# Patient Record
Sex: MALE | Race: WHITE | NOT HISPANIC OR LATINO | Employment: UNEMPLOYED | URBAN - METROPOLITAN AREA
[De-identification: names, ages, dates, MRNs, and addresses within clinical notes are randomized per-mention and may not be internally consistent; named-entity substitution may affect disease eponyms.]

---
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
## 2024-05-01 ENCOUNTER — VIRTUAL VISIT (OUTPATIENT)
Dept: FAMILY MEDICINE | Facility: CLINIC | Age: 19
End: 2024-05-01
Payer: COMMERCIAL

## 2024-05-01 ENCOUNTER — LAB (OUTPATIENT)
Dept: LAB | Facility: CLINIC | Age: 19
End: 2024-05-01
Payer: COMMERCIAL

## 2024-05-01 DIAGNOSIS — R50.9 FEVER, UNSPECIFIED FEVER CAUSE: Primary | ICD-10-CM

## 2024-05-01 DIAGNOSIS — R59.9 SWOLLEN LYMPH NODES: ICD-10-CM

## 2024-05-01 DIAGNOSIS — R50.9 FEVER, UNSPECIFIED FEVER CAUSE: ICD-10-CM

## 2024-05-01 LAB
ALBUMIN SERPL BCG-MCNC: 4.5 G/DL (ref 3.5–5.2)
ALP SERPL-CCNC: 192 U/L (ref 65–260)
ALT SERPL W P-5'-P-CCNC: 221 U/L (ref 0–50)
AST SERPL W P-5'-P-CCNC: 167 U/L (ref 0–35)
BASOPHILS # BLD AUTO: ABNORMAL 10*3/UL
BASOPHILS NFR BLD AUTO: ABNORMAL %
BILIRUB DIRECT SERPL-MCNC: <0.2 MG/DL (ref 0–0.3)
BILIRUB SERPL-MCNC: 0.7 MG/DL
DEPRECATED S PYO AG THROAT QL EIA: NEGATIVE
EOSINOPHIL # BLD AUTO: ABNORMAL 10*3/UL
EOSINOPHIL NFR BLD AUTO: ABNORMAL %
ERYTHROCYTE [DISTWIDTH] IN BLOOD BY AUTOMATED COUNT: 12.3 % (ref 10–15)
FLUAV AG SPEC QL IA: NEGATIVE
FLUBV AG SPEC QL IA: NEGATIVE
GROUP A STREP BY PCR: NOT DETECTED
HCT VFR BLD AUTO: 39 % (ref 40–53)
HGB BLD-MCNC: 13.9 G/DL (ref 13.3–17.7)
IMM GRANULOCYTES # BLD: ABNORMAL 10*3/UL
IMM GRANULOCYTES NFR BLD: ABNORMAL %
LYMPHOCYTES # BLD AUTO: ABNORMAL 10*3/UL
LYMPHOCYTES NFR BLD AUTO: ABNORMAL %
MCH RBC QN AUTO: 30.1 PG (ref 26.5–33)
MCHC RBC AUTO-ENTMCNC: 35.6 G/DL (ref 31.5–36.5)
MCV RBC AUTO: 84 FL (ref 78–100)
MONOCYTES # BLD AUTO: ABNORMAL 10*3/UL
MONOCYTES NFR BLD AUTO: ABNORMAL %
NEUTROPHILS # BLD AUTO: ABNORMAL 10*3/UL
NEUTROPHILS NFR BLD AUTO: ABNORMAL %
NRBC # BLD AUTO: 0 10E3/UL
NRBC BLD AUTO-RTO: 0 /100
PLATELET # BLD AUTO: 147 10E3/UL (ref 150–450)
PROT SERPL-MCNC: 8.4 G/DL (ref 6.4–8.3)
RBC # BLD AUTO: 4.62 10E6/UL (ref 4.4–5.9)
WBC # BLD AUTO: 9.5 10E3/UL (ref 4–11)

## 2024-05-01 PROCEDURE — 85027 COMPLETE CBC AUTOMATED: CPT | Performed by: PATHOLOGY

## 2024-05-01 PROCEDURE — 80076 HEPATIC FUNCTION PANEL: CPT | Performed by: PATHOLOGY

## 2024-05-01 PROCEDURE — 87804 INFLUENZA ASSAY W/OPTIC: CPT

## 2024-05-01 PROCEDURE — 86308 HETEROPHILE ANTIBODY SCREEN: CPT

## 2024-05-01 PROCEDURE — 86735 MUMPS ANTIBODY: CPT | Mod: 90 | Performed by: PATHOLOGY

## 2024-05-01 PROCEDURE — 99213 OFFICE O/P EST LOW 20 MIN: CPT | Mod: 95

## 2024-05-01 PROCEDURE — 36415 COLL VENOUS BLD VENIPUNCTURE: CPT | Performed by: PATHOLOGY

## 2024-05-01 PROCEDURE — 99000 SPECIMEN HANDLING OFFICE-LAB: CPT | Performed by: PATHOLOGY

## 2024-05-01 PROCEDURE — 87651 STREP A DNA AMP PROBE: CPT

## 2024-05-01 PROCEDURE — 85007 BL SMEAR W/DIFF WBC COUNT: CPT

## 2024-05-01 PROCEDURE — 87799 DETECT AGENT NOS DNA QUANT: CPT

## 2024-05-01 ASSESSMENT — ENCOUNTER SYMPTOMS
FEVER: 1
COUGH: 1

## 2024-05-01 NOTE — PROGRESS NOTES
Sebastián is a 18 year old who is being evaluated via a billable video visit.    How would you like to obtain your AVS? MyChart  If the video visit is dropped, the invitation should be resent by: Text to cell phone: [phone]  Will anyone else be joining your video visit? No      Assessment & Plan     Fever, unspecified fever cause  Acute, ongoing for 5 days. Considering pt is having fevers without significant respiratory symptoms, has swollen gland on left upper anterior neck and lives in dormitory setting will test for mono and mumps as well. College student, track athlete at [employer] so frequent follow up could be difficult so will also get CBC and hepatic panel with initial labs.   - Influenza A & B Antigen - Clinic Collect  - Eden Barr Virus Quantitative PCR, Plasma  - CBC with platelets and differential  - Hepatic panel (Albumin, ALT, AST, Bili, Alk Phos, TP)  - Streptococcus A Rapid Screen w/Reflex to PCR - Clinic Collect  - Mumps Immune Status, IgM    Swollen lymph nodes  DDx includes lymphadenopathy vs parotitis.   - Streptococcus A Rapid Screen w/Reflex to PCR - Clinic Collect  - Mumps Immune Status, IgM                See Patient Instructions    Subjective   Sebastián is a 18 year old, presenting for the following health issues:  Fever and Cough    Fever  This is a new problem. The current episode started in the past 7 days. Associated symptoms include coughing and a fever.   Cough    History of Present Illness       Reason for visit:  Concerned for Mono  Symptom onset:  3-7 days ago  Symptoms include:  Fever and mild cough  Symptom intensity:  Mild    101.5 is highest, came down a little with Dayquil.   Started Saturday  No sore throat. No sinus congestion. + cough. Cough is mild/comes and goes, worse at night.     No known contacts + for mono but parents concerned due to his swollen lymph node.   Was on spring break 5 or 6 weeks ago and had fever then for a few day as well without significant URI symptoms.      No abdominal pain.   No flu shot this year.   Home covid test negative on Sunday.     No chest pain.   Slightly winded but not at rest.                 Review of Systems  Constitutional, HEENT, cardiovascular, pulmonary, gi and gu systems are negative, except as otherwise noted.      Objective           Vitals:  No vitals were obtained today due to virtual visit.    Physical Exam   GENERAL: alert and no distress  EYES: Eyes grossly normal to inspection.  No discharge or erythema, or obvious scleral/conjunctival abnormalities.  RESP: No audible wheeze, cough, or visible cyanosis.    SKIN: Visible skin clear. No significant rash, abnormal pigmentation or lesions.  NEURO: Cranial nerves grossly intact.  Mentation and speech appropriate for age.  PSYCH: Appropriate affect, tone, and pace of words    No results found for this or any previous visit (from the past 24 hour(s)).      Video-Visit Details    Type of service:  Video Visit   Originating Location (pt. Location): Home    Distant Location (provider location):  On-site  Platform used for Video Visit: Nick  Signed Electronically by: JENISE Lewis CNP

## 2024-05-01 NOTE — PATIENT INSTRUCTIONS
Schedule lab appointment - checking for influenza, strep, mono (EBV) and mumps testing, blood counts, and liver function test.     If all negative - you likely have an upper respiratory infection. The majority of these are caused by various viruses that circulate every year. These typically start improving around day 7-10. If worsening or not improving by day 10-14 you should schedule a follow up appointment as this can indicate a bacterial infection.     If positive I will send instructions on when to recheck/follow up.     Monitor your lymph node.  Monitor your Heart Rate on your apple watch. Follow up if it's consistently elevated (above 100 while you are at rest and you have a fever).       OVER THE COUNTER MEDs and COMFORT MEASURES  NON-Medications  Push hydration (you should be annoyed about peeing frequently)  Hot water + honey, sip on throughout the day  Cool mist humidifier, at bedside   Steam shower for 10-15 minutes 2-3 times per day for comfort to relieve cough    If you take any herbal supplements make sure they are from a reputable company with 3rd party testing.     Acetaminophen  (aka Tylenol) 1000 mg up to 3 x per day   [Fever, muscle aches, head/sinus pain, chest tightness/soreness]  (Do not take if you have liver disease, avoid alcohol while taking).  I recommend alternating acetaminophen / ibuprofen every 4 hours    Ibuprofen (aka motrin, Advil) 200-800 mg up to 3 x per day  [Fever, inflammation, congestion, pain/soreness].  (Do not take if you have kidney disease).   If you have heartburn/reflux, take blood thinners, or take a selective serotonin reuptake inhibitor, you should take over the counter famotidine (pepcid) or pantoprazole 20 mg daily (if already taking can increase dose) while you are taking ibuprofen regularly to prevent worsening.    Fluticasone (flonase) nasal spray one time daily (especially for ear pain)  OK to continue other over the counter allergy meds

## 2024-05-02 ENCOUNTER — TELEPHONE (OUTPATIENT)
Dept: PEDIATRICS | Facility: CLINIC | Age: 19
End: 2024-05-02
Payer: COMMERCIAL

## 2024-05-02 ENCOUNTER — TELEPHONE (OUTPATIENT)
Dept: FAMILY MEDICINE | Facility: CLINIC | Age: 19
End: 2024-05-02
Payer: COMMERCIAL

## 2024-05-02 DIAGNOSIS — D69.6 THROMBOCYTOPENIA (H): Primary | ICD-10-CM

## 2024-05-02 LAB
BASOPHILS # BLD MANUAL: 0.1 10E3/UL (ref 0–0.2)
BASOPHILS NFR BLD MANUAL: 1 %
EBV DNA SERPL NAA+PROBE-ACNC: 1850 IU/ML
EBV DNA SERPL NAA+PROBE-LOG#: 3.3 {LOG_COPIES}/ML
EOSINOPHIL # BLD MANUAL: 0.1 10E3/UL (ref 0–0.7)
EOSINOPHIL NFR BLD MANUAL: 1 %
LYMPHOCYTES # BLD MANUAL: 6.8 10E3/UL (ref 0.8–5.3)
LYMPHOCYTES NFR BLD MANUAL: 72 %
MONOCYTES # BLD MANUAL: 0.8 10E3/UL (ref 0–1.3)
MONOCYTES NFR BLD AUTO: POSITIVE %
MONOCYTES NFR BLD MANUAL: 8 %
NEUTROPHILS # BLD MANUAL: 1.7 10E3/UL (ref 1.6–8.3)
NEUTROPHILS NFR BLD MANUAL: 18 %
PATH REV: ABNORMAL
PLAT MORPH BLD: ABNORMAL
RBC MORPH BLD: ABNORMAL

## 2024-05-02 NOTE — TELEPHONE ENCOUNTER
I recommend not returning to school until fevers have improved. I sent him a school excuse note via [employer]. Let me know if he needs adjustments to the note. Its reasonable to stay home for first 1-2 weeks, intermittently through week 3-4.     Also let him know update - the rapid mono test is positive which is good to know since that is reassuring the it is the cause of his abnormal blood counts and liver labs.

## 2024-05-02 NOTE — TELEPHONE ENCOUNTER
"Please call patient. And review my note below. Labs indicate mono, still processing some additional labs for confirmation.   I did try calling him x 3 this afternoon but it goes directly to .     He needs to follow up in 3-4 weeks. Some of his abnormal labs need to be monitored until they return to normal. Ideally in person,     1. MILD low level of platelets (called thrombocytopenia) but reassuring your platelets look normal size/shape. Monitor for signs of easy bleeding like bleeding gums, bruising or small red dots (petechiae), blood in stool or urine. This is likely caused by irritation of liver and spleen due to mono.     2. Elevated liver enzymes (ALT, AST, 4-5 times normal) abstain from alcohol. Minimize tylenol use.    Do not participate in strenuous activity for at least 21 days since symptom onset. For you, if symptoms started Saturday 4/27 you should not return to strenuous track practice before Saturday 5/18. Light activity is fine.           Infectious mononucleosis, In general, mononucleosis is not serious illness. However, it can lead to significant loss of time from school or work due to profound fatigue and, on rare occasions, can cause severe or even life-threatening illness    HOW DID I GET MONO?  Caused most often by the Eden-Barr virus (EBV). EBV can spread through contact with saliva from an infected person; for example, you can get it through kissing, sharing eating utensils with, or drinking from the same glass as someone who is infected with the virus.  Many people are exposed to EBV at some point during childhood, although they may not realize it at the time. For adolescents and young adults who were not infected as a child, exposure often results from contact with an infected person's saliva. After a person has been exposed, the virus remains in their body for the rest of their life. This condition is called \"viral latency,\" meaning it is dormant or inactive and not causing symptoms. " "People spread the infection to others without realizing it through intermittent shedding of the virus through their saliva. In addition, it may also be possible to spread the virus through other bodily fluids, such as semen or vaginal secretions.    Common symptoms -- It may take four to eight weeks after initially acquiring the virus for the first symptoms (such as body aches, headache, and low-grade fever) to appear. The most common mono symptoms include:  ?Fever (temperature greater than 100.4 F or 38 C)  ?Sore throat  ?Enlarged lymph nodes in the neck (and sometimes elsewhere in the body)  ?Fatigue, which may be severe and can occasionally last for more than a month    Enlargement of the spleen -- The spleen is an organ in the left upper abdomen, just under the diaphragm     It becomes enlarged in about half of people with mono. Due to the spleen enlarging, need to avoid contact sports or heavy lifting to prevent rare complication of splenic rupture causing hemorrhage that can occur after trauma but can also happen spontaneously. Symptoms of rupture include sudden, sharp pain in the abdomen, especially on the left side. Sometimes the left shoulder hurts; this is due to \"referred pain\" from blood irritating the diaphragm. Splenic rupture is a potentially life-threatening complication that requires immediate medical treatment. Therefore, any severe abdominal pain in someone with mono is an emergency that requires urgent evaluation     MONO TREATMENT  The goal of treatment is to ease the symptoms while the immune system contains the virus. Antibiotics (which are used to treat bacterial infections) are not helpful because a virus causes mono. No antiviral medications are known to treat or cure Eden-Barr virus effectively.    Pain and fever -- Sore throat, muscle aches, and fever can be treated with nonprescription medications, such as acetaminophen (aka Tylenol) or ibuprofen (aka Motrin, Advil). The liver breaks " down acetaminophen. For this reason, it is important to closely follow the dosing instructions or your healthcare provider's instructions to take this medication safely. Acetaminophen and ibuprofen are also recommended for use in children. Aspirin should not be given to young children with mono because of possible liver complications.    Rest -- Mono can cause severe fatigue, although most people recover within two to four weeks. For some, significant tiredness lasts for weeks to months. Early in the infection, it is important to get adequate rest, although complete bed rest is unnecessary.    Diet -- Feeling ill often causes a loss of appetite. This is normal and usually improves as the infection resolves. It is essential, even if you have no appetite, to drink an adequate amount of fluids. This is especially true if you take ibuprofen for pain or fever because ibuprofen can affect kidney function if you become dehydrated. You are drinking adequate fluids if your urine is a pale yellow color.

## 2024-05-02 NOTE — TELEPHONE ENCOUNTER
----- Message from JENISE Morales CNP sent at 5/2/2024 12:57 PM CDT -----  Pls call pt. See my cmment and note for details. Also sent this in lark message but want to confirm pt received message.

## 2024-05-02 NOTE — TELEPHONE ENCOUNTER
Attempt #1 to call patient.     RN left voicemail and requested return call to [facility] at [phone].     Fe Coulter RN, BSN  [facility]: [place]

## 2024-05-02 NOTE — TELEPHONE ENCOUNTER
Attempt #1 to call patient.     RN left voicemail and requested return call to [facility] at [phone].     Fe Coulter RN, BSN  [facility]: [place]

## 2024-05-02 NOTE — TELEPHONE ENCOUNTER
Routing to Álvaro rBuce.    Patient asking if it is ok for him to return to school.    Patient still having temps in the evening.        RN called and spoke with patient.    RN reviewed providers message and recommendations.    Patient verbalized understanding.    RN advised if patient needed letter for track to reach out.    Patient asking about returning to school.    RN advised she would reach out to provider.    Juanito Estevez RN, BSN, PHN  [facility]

## 2024-05-02 NOTE — TELEPHONE ENCOUNTER
RN left  for patient requesting call back to clinic.      RN called to relay providers message.    Juanito Estevez RN, BSN, PHN  [facility]

## 2024-05-02 NOTE — LETTER
May 2, 2024      Duane Burger  [address] [facility] [address]        To Whom It May Concern:    Duane Burger was seen on 5/1/24.  Please excuse him from classes until 5/6/24 due to illness. He may require intermittent time away through 5/18/24. Please allow him assignment extensions according to your institutions serious illness policies through this time frame.         Sincerely,        Álvaro Bruce, AB, APRN, FNP-C  Family Nurse Practitioner  [facility]

## 2024-05-02 NOTE — TELEPHONE ENCOUNTER
Patient calling back, I advised him of letter for school excuse in Mychart.  He found the letter, says it looks good.    He will call or mychart with further concerns.    Carmen ALLEN RN  [facility] Triage

## 2024-05-04 LAB — MUV IGM SER IA-ACNC: 1.37 IV

## 2024-06-06 ENCOUNTER — OFFICE VISIT (OUTPATIENT)
Dept: FAMILY MEDICINE | Facility: CLINIC | Age: 19
End: 2024-06-06
Payer: COMMERCIAL

## 2024-06-06 VITALS
WEIGHT: 179.4 LBS | DIASTOLIC BLOOD PRESSURE: 76 MMHG | SYSTOLIC BLOOD PRESSURE: 147 MMHG | BODY MASS INDEX: 21.18 KG/M2 | OXYGEN SATURATION: 96 % | TEMPERATURE: 98.1 F | RESPIRATION RATE: 20 BRPM | HEART RATE: 78 BPM | HEIGHT: 77 IN

## 2024-06-06 DIAGNOSIS — B27.99 INFECTIOUS MONONUCLEOSIS, WITH OTHER COMPLICATION, INFECTIOUS MONONUCLEOSIS DUE TO UNSPECIFIED ORGANISM: Primary | ICD-10-CM

## 2024-06-06 PROCEDURE — 99213 OFFICE O/P EST LOW 20 MIN: CPT

## 2024-06-06 NOTE — PROGRESS NOTES
Assessment & Plan     Infectious mononucleosis, with other complication, infectious mononucleosis due to unspecified organism  Acute, resolved. Feeling back to baseline health. Will recheck labs to ensure liver labs and cbc improved.   - CBC with platelets and differential  - Comprehensive metabolic panel (BMP + Alb, Alk Phos, ALT, AST, Total. Bili, TP)              See Patient Instructions    Subjective   Will is a 18 year old, presenting for the following health issues:  RECHECK        6/6/2024     1:03 PM   Additional Questions   Roomed by Emili PONCE MA         6/6/2024     1:03 PM   Patient Reported Additional Medications   Patient reports taking the following new medications Conrad allergy medication     History of Present Illness       Reason for visit:  Check up visit related to prior mono diagnosis    He eats 4 or more servings of fruits and vegetables daily.He consumes 1 sweetened beverage(s) daily.He exercises with enough effort to increase his heart rate 30 to 60 minutes per day.  He exercises with enough effort to increase his heart rate 7 days per week.   He is taking medications regularly.       Was improving and feeing better then got a Fever Sunday night and went away Tuesday night, cough and mucous build up.     Patient has been taking OTC allergy Conrad brand.     No abdominal pain.   No pain/swelling in neck.   No chest pain, SOB  No headaches.   No urinary problems.    Track season over, lifts weights, basketball, .    Energy level is back to normal.  No noticeable brain fog.   No issues finishing semester of school.      Wt Readings from Last 5 Encounters:   06/06/24 81.4 kg (179 lb 6.4 oz) (81%, Z= 0.87)*   07/26/23 78.9 kg (174 lb) (79%, Z= 0.81)*   07/12/23 78.5 kg (173 lb) (78%, Z= 0.79)*   04/14/21 73.9 kg (163 lb) (82%, Z= 0.92)*   02/04/20 68.9 kg (152 lb) (83%, Z= 0.96)*     * Growth percentiles are based on CDC (Boys, 2-20 Years) data.            Review of Systems  Constitutional,  "HEENT, cardiovascular, pulmonary, gi and gu systems are negative, except as otherwise noted.      Objective    BP (!) 147/76 (BP Location: Left arm, Patient Position: Chair, Cuff Size: Adult Regular)   Pulse 78   Temp 98.1  F (36.7  C) (Oral)   Resp 20   Ht 1.943 m (6' 4.5\")   Wt 81.4 kg (179 lb 6.4 oz)   SpO2 96%   BMI 21.55 kg/m    Body mass index is 21.55 kg/m .  Physical Exam   GENERAL: alert and no distress  NECK: no adenopathy, no asymmetry, masses, or scars  RESP: lungs clear to auscultation - no rales, rhonchi or wheezes  CV: regular rate and rhythm, normal S1 S2, no S3 or S4, no murmur, click or rub, no peripheral edema  ABDOMEN: soft, nontender, no hepatosplenomegaly, no masses and bowel sounds normal  MS: no gross musculoskeletal defects noted, no edema    Lab on 05/01/2024   Component Date Value Ref Range Status    EBV DNA Log IU/mL 05/01/2024 3.3   Final    EBV DNA IU/mL, Instrument 05/01/2024 1,850 (H)  Not Detected IU/mL Final    Protein Total 05/01/2024 8.4 (H)  6.4 - 8.3 g/dL Final    Albumin 05/01/2024 4.5  3.5 - 5.2 g/dL Final    Bilirubin Total 05/01/2024 0.7  <=1.2 mg/dL Final    Alkaline Phosphatase 05/01/2024 192  65 - 260 U/L Final    Reference intervals for this test were updated on 11/14/2023 to more accurately reflect our healthy population. There may be differences in the flagging of prior results with similar values performed with this method. Interpretation of those prior results can be made in the context of the updated reference intervals.    AST 05/01/2024 167 (H)  0 - 35 U/L Final    Reference intervals for this test were updated on 6/12/2023 to more accurately reflect our healthy population. There may be differences in the flagging of prior results with similar values performed with this method. Interpretation of those prior results can be made in the context of the updated reference intervals.    ALT 05/01/2024 221 (H)  0 - 50 U/L Final    Reference intervals for this test " were updated on 6/12/2023 to more accurately reflect our healthy population. There may be differences in the flagging of prior results with similar values performed with this method. Interpretation of those prior results can be made in the context of the updated reference intervals.      Bilirubin Direct 05/01/2024 <0.20  0.00 - 0.30 mg/dL Final    Mumps Erica IgM 05/01/2024 1.37 (H)  <=0.79 IV Final    INTERPRETIVE INFORMATION: Mumps Virus Antibody, IgM      0.79 IV or less:    Negative - No significant level of                        detectable IgM antibody to mumps                        virus.    0.80 - 1.20 IV:     Equivocal - Borderline levels of IgM                        antibody to mumps virus. Repeat                        testing in 10-14 days may be helpful.    1.21 IV or greater: Positive - Presence of IgM antibody                        to mumps virus detected, which may                        indicate a current or recent                        infection. However, low levels of IgM                        antibody may occasionally persist for                        more than 12 months post-infection or                        immunization.  Performed By: [facility] [employer]  [address] [place] [id number]  : Manav Armenta MD, PhD  CLIA Number: [id number]    WBC Count 05/01/2024 9.5  4.0 - 11.0 10e3/uL Final    RBC Count 05/01/2024 4.62  4.40 - 5.90 10e6/uL Final    Hemoglobin 05/01/2024 13.9  13.3 - 17.7 g/dL Final    Hematocrit 05/01/2024 39.0 (L)  40.0 - 53.0 % Final    MCV 05/01/2024 84  78 - 100 fL Final    MCH 05/01/2024 30.1  26.5 - 33.0 pg Final    MCHC 05/01/2024 35.6  31.5 - 36.5 g/dL Final    RDW 05/01/2024 12.3  10.0 - 15.0 % Final    Platelet Count 05/01/2024 147 (L)  150 - 450 10e3/uL Final    NRBCs per 100 WBC 05/01/2024 0  <1 /100 Final    Absolute NRBCs 05/01/2024 0.0  10e3/uL Final    % Neutrophils 05/01/2024 18  % Final    % Lymphocytes 05/01/2024 72  %  Final    % Monocytes 05/01/2024 8  % Final    % Eosinophils 05/01/2024 1  % Final    % Basophils 05/01/2024 1  % Final    Absolute Neutrophils 05/01/2024 1.7  1.6 - 8.3 10e3/uL Final    Absolute Lymphocytes 05/01/2024 6.8 (H)  0.8 - 5.3 10e3/uL Final    Absolute Monocytes 05/01/2024 0.8  0.0 - 1.3 10e3/uL Final    Absolute Eosinophils 05/01/2024 0.1  0.0 - 0.7 10e3/uL Final    Absolute Basophils 05/01/2024 0.1  0.0 - 0.2 10e3/uL Final    RBC Morphology 05/01/2024 Confirmed RBC Indices   Final    Platelet Assessment 05/01/2024 Automated Count Confirmed. Platelet morphology is normal.  Automated Count Confirmed. Platelet morphology is normal. Final    Pathologist Review Comments (Blood) 05/01/2024 Atypical lymphocytosis present, favored to be reactive in setting of the patient's known mononucleosis (positive EBV testing and monospot). Clinical correlation is recommended.    Bianka Epperson MD on 5/2/2024 at 5:53 PM   Final    This is an appended report. These results have been appended to a previously preliminary verified report.    Mononucleosis Screen 05/01/2024 Positive (A)  Negative Final           Signed Electronically by: JENISE Lewis CNP

## 2024-06-07 ENCOUNTER — LAB (OUTPATIENT)
Dept: LAB | Facility: CLINIC | Age: 19
End: 2024-06-07
Payer: COMMERCIAL

## 2024-06-07 DIAGNOSIS — B27.99 INFECTIOUS MONONUCLEOSIS, WITH OTHER COMPLICATION, INFECTIOUS MONONUCLEOSIS DUE TO UNSPECIFIED ORGANISM: ICD-10-CM

## 2024-06-07 LAB
ALBUMIN SERPL BCG-MCNC: 4.7 G/DL (ref 3.5–5.2)
ALP SERPL-CCNC: 82 U/L (ref 65–260)
ALT SERPL W P-5'-P-CCNC: 19 U/L (ref 0–50)
ANION GAP SERPL CALCULATED.3IONS-SCNC: 10 MMOL/L (ref 7–15)
AST SERPL W P-5'-P-CCNC: 22 U/L (ref 0–35)
BASOPHILS # BLD AUTO: 0 10E3/UL (ref 0–0.2)
BASOPHILS NFR BLD AUTO: 1 %
BILIRUB SERPL-MCNC: 0.5 MG/DL
BUN SERPL-MCNC: 13.6 MG/DL (ref 6–20)
CALCIUM SERPL-MCNC: 9.8 MG/DL (ref 8.6–10)
CHLORIDE SERPL-SCNC: 103 MMOL/L (ref 98–107)
CREAT SERPL-MCNC: 0.92 MG/DL (ref 0.67–1.17)
DEPRECATED HCO3 PLAS-SCNC: 27 MMOL/L (ref 22–29)
EGFRCR SERPLBLD CKD-EPI 2021: >90 ML/MIN/1.73M2
EOSINOPHIL # BLD AUTO: 0.3 10E3/UL (ref 0–0.7)
EOSINOPHIL NFR BLD AUTO: 5 %
ERYTHROCYTE [DISTWIDTH] IN BLOOD BY AUTOMATED COUNT: 12.2 % (ref 10–15)
GLUCOSE SERPL-MCNC: 94 MG/DL (ref 70–99)
HCT VFR BLD AUTO: 40.4 % (ref 40–53)
HGB BLD-MCNC: 14.3 G/DL (ref 13.3–17.7)
IMM GRANULOCYTES # BLD: 0 10E3/UL
IMM GRANULOCYTES NFR BLD: 0 %
LYMPHOCYTES # BLD AUTO: 1.8 10E3/UL (ref 0.8–5.3)
LYMPHOCYTES NFR BLD AUTO: 31 %
MCH RBC QN AUTO: 30 PG (ref 26.5–33)
MCHC RBC AUTO-ENTMCNC: 35.4 G/DL (ref 31.5–36.5)
MCV RBC AUTO: 85 FL (ref 78–100)
MONOCYTES # BLD AUTO: 0.4 10E3/UL (ref 0–1.3)
MONOCYTES NFR BLD AUTO: 6 %
NEUTROPHILS # BLD AUTO: 3.3 10E3/UL (ref 1.6–8.3)
NEUTROPHILS NFR BLD AUTO: 57 %
NRBC # BLD AUTO: 0 10E3/UL
NRBC BLD AUTO-RTO: 0 /100
PLATELET # BLD AUTO: 210 10E3/UL (ref 150–450)
POTASSIUM SERPL-SCNC: 4.2 MMOL/L (ref 3.4–5.3)
PROT SERPL-MCNC: 8.4 G/DL (ref 6.4–8.3)
RBC # BLD AUTO: 4.77 10E6/UL (ref 4.4–5.9)
SODIUM SERPL-SCNC: 140 MMOL/L (ref 135–145)
WBC # BLD AUTO: 5.7 10E3/UL (ref 4–11)

## 2024-06-07 PROCEDURE — 80053 COMPREHEN METABOLIC PANEL: CPT | Performed by: PATHOLOGY

## 2024-06-07 PROCEDURE — 85025 COMPLETE CBC W/AUTO DIFF WBC: CPT | Performed by: PATHOLOGY

## 2024-06-07 PROCEDURE — 36415 COLL VENOUS BLD VENIPUNCTURE: CPT | Performed by: PATHOLOGY

## 2024-09-21 ENCOUNTER — HEALTH MAINTENANCE LETTER (OUTPATIENT)
Age: 19
End: 2024-09-21